# Patient Record
Sex: MALE
[De-identification: names, ages, dates, MRNs, and addresses within clinical notes are randomized per-mention and may not be internally consistent; named-entity substitution may affect disease eponyms.]

---

## 2018-07-09 PROBLEM — Z00.00 ENCOUNTER FOR PREVENTIVE HEALTH EXAMINATION: Status: ACTIVE | Noted: 2018-07-09

## 2018-07-16 ENCOUNTER — APPOINTMENT (OUTPATIENT)
Dept: CARDIOLOGY | Facility: CLINIC | Age: 53
End: 2018-07-16
Payer: COMMERCIAL

## 2018-07-16 ENCOUNTER — NON-APPOINTMENT (OUTPATIENT)
Age: 53
End: 2018-07-16

## 2018-07-16 VITALS — HEART RATE: 77 BPM | HEIGHT: 69 IN | WEIGHT: 248 LBS | OXYGEN SATURATION: 98 % | BODY MASS INDEX: 36.73 KG/M2

## 2018-07-16 VITALS — SYSTOLIC BLOOD PRESSURE: 124 MMHG | DIASTOLIC BLOOD PRESSURE: 80 MMHG

## 2018-07-16 DIAGNOSIS — Z82.49 FAMILY HISTORY OF ISCHEMIC HEART DISEASE AND OTHER DISEASES OF THE CIRCULATORY SYSTEM: ICD-10-CM

## 2018-07-16 DIAGNOSIS — Z86.59 PERSONAL HISTORY OF OTHER MENTAL AND BEHAVIORAL DISORDERS: ICD-10-CM

## 2018-07-16 DIAGNOSIS — R29.818 OTHER SYMPTOMS AND SIGNS INVOLVING THE NERVOUS SYSTEM: ICD-10-CM

## 2018-07-16 DIAGNOSIS — Z82.3 FAMILY HISTORY OF STROKE: ICD-10-CM

## 2018-07-16 DIAGNOSIS — Z87.898 PERSONAL HISTORY OF OTHER SPECIFIED CONDITIONS: ICD-10-CM

## 2018-07-16 DIAGNOSIS — R53.83 OTHER FATIGUE: ICD-10-CM

## 2018-07-16 DIAGNOSIS — F19.21 OTHER PSYCHOACTIVE SUBSTANCE DEPENDENCE, IN REMISSION: ICD-10-CM

## 2018-07-16 DIAGNOSIS — E78.00 PURE HYPERCHOLESTEROLEMIA, UNSPECIFIED: ICD-10-CM

## 2018-07-16 DIAGNOSIS — Z87.891 PERSONAL HISTORY OF NICOTINE DEPENDENCE: ICD-10-CM

## 2018-07-16 DIAGNOSIS — R60.0 LOCALIZED EDEMA: ICD-10-CM

## 2018-07-16 DIAGNOSIS — R06.02 SHORTNESS OF BREATH: ICD-10-CM

## 2018-07-16 PROCEDURE — 93000 ELECTROCARDIOGRAM COMPLETE: CPT

## 2018-07-16 PROCEDURE — 99204 OFFICE O/P NEW MOD 45 MIN: CPT

## 2018-07-16 RX ORDER — FUROSEMIDE 20 MG/1
20 TABLET ORAL DAILY
Qty: 90 | Refills: 1 | Status: ACTIVE | COMMUNITY
Start: 2018-07-16

## 2018-07-31 ENCOUNTER — APPOINTMENT (OUTPATIENT)
Dept: CARDIOLOGY | Facility: CLINIC | Age: 53
End: 2018-07-31

## 2018-08-17 ENCOUNTER — APPOINTMENT (OUTPATIENT)
Dept: CARDIOLOGY | Facility: CLINIC | Age: 53
End: 2018-08-17

## 2018-10-15 ENCOUNTER — EMERGENCY (EMERGENCY)
Facility: HOSPITAL | Age: 53
LOS: 1 days | Discharge: HOME | End: 2018-10-15
Attending: EMERGENCY MEDICINE
Payer: COMMERCIAL

## 2018-10-15 VITALS — WEIGHT: 225.09 LBS | HEIGHT: 69 IN

## 2018-10-15 VITALS
TEMPERATURE: 98 F | OXYGEN SATURATION: 100 % | SYSTOLIC BLOOD PRESSURE: 126 MMHG | RESPIRATION RATE: 17 BRPM | HEART RATE: 76 BPM | DIASTOLIC BLOOD PRESSURE: 74 MMHG

## 2018-10-15 LAB
ALBUMIN SERPL ELPH-MCNC: 4.2 G/DL — SIGNIFICANT CHANGE UP (ref 3.3–5)
ALP SERPL-CCNC: 71 U/L — SIGNIFICANT CHANGE UP (ref 40–120)
ALT FLD-CCNC: 23 U/L — SIGNIFICANT CHANGE UP (ref 10–45)
ANION GAP SERPL CALC-SCNC: 14 MMOL/L — SIGNIFICANT CHANGE UP (ref 5–17)
APTT BLD: 32.9 SEC — SIGNIFICANT CHANGE UP (ref 27.5–37.4)
AST SERPL-CCNC: 21 U/L — SIGNIFICANT CHANGE UP (ref 10–40)
BASOPHILS # BLD AUTO: 0.1 K/UL — SIGNIFICANT CHANGE UP (ref 0–0.2)
BASOPHILS NFR BLD AUTO: 1 % — SIGNIFICANT CHANGE UP (ref 0–2)
BILIRUB SERPL-MCNC: 0.4 MG/DL — SIGNIFICANT CHANGE UP (ref 0.2–1.2)
BUN SERPL-MCNC: 11 MG/DL — SIGNIFICANT CHANGE UP (ref 7–23)
CALCIUM SERPL-MCNC: 9 MG/DL — SIGNIFICANT CHANGE UP (ref 8.4–10.5)
CHLORIDE SERPL-SCNC: 103 MMOL/L — SIGNIFICANT CHANGE UP (ref 96–108)
CO2 SERPL-SCNC: 20 MMOL/L — LOW (ref 22–31)
CREAT SERPL-MCNC: 0.77 MG/DL — SIGNIFICANT CHANGE UP (ref 0.5–1.3)
EOSINOPHIL # BLD AUTO: 0.3 K/UL — SIGNIFICANT CHANGE UP (ref 0–0.5)
EOSINOPHIL NFR BLD AUTO: 3.5 % — SIGNIFICANT CHANGE UP (ref 0–6)
GLUCOSE SERPL-MCNC: 118 MG/DL — HIGH (ref 70–99)
HCT VFR BLD CALC: 48.2 % — SIGNIFICANT CHANGE UP (ref 39–50)
HGB BLD-MCNC: 15.8 G/DL — SIGNIFICANT CHANGE UP (ref 13–17)
INR BLD: 1.14 RATIO — SIGNIFICANT CHANGE UP (ref 0.88–1.16)
LIDOCAIN IGE QN: 18 U/L — SIGNIFICANT CHANGE UP (ref 7–60)
LYMPHOCYTES # BLD AUTO: 3.1 K/UL — SIGNIFICANT CHANGE UP (ref 1–3.3)
LYMPHOCYTES # BLD AUTO: 33.9 % — SIGNIFICANT CHANGE UP (ref 13–44)
MCHC RBC-ENTMCNC: 27.6 PG — SIGNIFICANT CHANGE UP (ref 27–34)
MCHC RBC-ENTMCNC: 32.8 GM/DL — SIGNIFICANT CHANGE UP (ref 32–36)
MCV RBC AUTO: 84.1 FL — SIGNIFICANT CHANGE UP (ref 80–100)
MONOCYTES # BLD AUTO: 0.4 K/UL — SIGNIFICANT CHANGE UP (ref 0–0.9)
MONOCYTES NFR BLD AUTO: 4.9 % — SIGNIFICANT CHANGE UP (ref 2–14)
NEUTROPHILS # BLD AUTO: 5.2 K/UL — SIGNIFICANT CHANGE UP (ref 1.8–7.4)
NEUTROPHILS NFR BLD AUTO: 56.8 % — SIGNIFICANT CHANGE UP (ref 43–77)
PLATELET # BLD AUTO: 298 K/UL — SIGNIFICANT CHANGE UP (ref 150–400)
POTASSIUM SERPL-MCNC: 4.3 MMOL/L — SIGNIFICANT CHANGE UP (ref 3.5–5.3)
POTASSIUM SERPL-SCNC: 4.3 MMOL/L — SIGNIFICANT CHANGE UP (ref 3.5–5.3)
PROT SERPL-MCNC: 8 G/DL — SIGNIFICANT CHANGE UP (ref 6–8.3)
PROTHROM AB SERPL-ACNC: 12.3 SEC — SIGNIFICANT CHANGE UP (ref 9.8–12.7)
RBC # BLD: 5.73 M/UL — SIGNIFICANT CHANGE UP (ref 4.2–5.8)
RBC # FLD: 11.9 % — SIGNIFICANT CHANGE UP (ref 10.3–14.5)
SODIUM SERPL-SCNC: 137 MMOL/L — SIGNIFICANT CHANGE UP (ref 135–145)
WBC # BLD: 9.1 K/UL — SIGNIFICANT CHANGE UP (ref 3.8–10.5)
WBC # FLD AUTO: 9.1 K/UL — SIGNIFICANT CHANGE UP (ref 3.8–10.5)

## 2018-10-15 PROCEDURE — 70450 CT HEAD/BRAIN W/O DYE: CPT | Mod: 26

## 2018-10-15 PROCEDURE — 85027 COMPLETE CBC AUTOMATED: CPT

## 2018-10-15 PROCEDURE — 85610 PROTHROMBIN TIME: CPT

## 2018-10-15 PROCEDURE — 83690 ASSAY OF LIPASE: CPT

## 2018-10-15 PROCEDURE — 96374 THER/PROPH/DIAG INJ IV PUSH: CPT | Mod: XU

## 2018-10-15 PROCEDURE — 12001 RPR S/N/AX/GEN/TRNK 2.5CM/<: CPT

## 2018-10-15 PROCEDURE — 99285 EMERGENCY DEPT VISIT HI MDM: CPT | Mod: 25

## 2018-10-15 PROCEDURE — 90471 IMMUNIZATION ADMIN: CPT

## 2018-10-15 PROCEDURE — 99284 EMERGENCY DEPT VISIT MOD MDM: CPT | Mod: 25

## 2018-10-15 PROCEDURE — 71045 X-RAY EXAM CHEST 1 VIEW: CPT

## 2018-10-15 PROCEDURE — 80053 COMPREHEN METABOLIC PANEL: CPT

## 2018-10-15 PROCEDURE — 71045 X-RAY EXAM CHEST 1 VIEW: CPT | Mod: 26

## 2018-10-15 PROCEDURE — 70450 CT HEAD/BRAIN W/O DYE: CPT

## 2018-10-15 PROCEDURE — 85730 THROMBOPLASTIN TIME PARTIAL: CPT

## 2018-10-15 RX ORDER — TETANUS TOXOID, REDUCED DIPHTHERIA TOXOID AND ACELLULAR PERTUSSIS VACCINE, ADSORBED 5; 2.5; 8; 8; 2.5 [IU]/.5ML; [IU]/.5ML; UG/.5ML; UG/.5ML; UG/.5ML
0.5 SUSPENSION INTRAMUSCULAR ONCE
Refills: 0 | Status: COMPLETED | OUTPATIENT
Start: 2018-10-15 | End: 2018-10-15

## 2018-10-15 RX ORDER — KETOROLAC TROMETHAMINE 30 MG/ML
15 SYRINGE (ML) INJECTION ONCE
Refills: 0 | Status: DISCONTINUED | OUTPATIENT
Start: 2018-10-15 | End: 2018-10-15

## 2018-10-15 RX ADMIN — Medication 15 MILLIGRAM(S): at 12:06
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
## 2018-10-15 NOTE — ED PROVIDER NOTE - PHYSICAL EXAMINATION
Rupa Maharaj M.D.:   patient awake alert NAD .   LUNGS CTAB no wheeze no crackle.   CARD RRR no m/r/g.    Abdomen soft NT ND no rebound no guarding no CVA tenderness.   EXT WWP no edema no calf tenderness CV 2+DP/PT bilaterally. no midline c./t.l spine tenderness. chest wall and pelvis stable without pain or bony deformity.  neuro A&Ox3 gait normal.    skin warm and dry no rash 1 cm linear laceration to posterior right occiput.  HEENT: moist mucous membranes, PERRL, EOMI

## 2018-10-15 NOTE — ED ADULT NURSE NOTE - NSIMPLEMENTINTERV_GEN_ALL_ED
Implemented All Fall Risk Interventions:  [place] to call system. Call bell, personal items and telephone within reach. Instruct patient to call for assistance. Room bathroom lighting operational. Non-slip footwear when patient is off stretcher. Physically safe environment: no spills, clutter or unnecessary equipment. Stretcher in lowest position, wheels locked, appropriate side rails in place. Provide visual cue, wrist band, yellow gown, etc. Monitor gait and stability. Monitor for mental status changes and reorient to person, place, and time. Review medications for side effects contributing to fall risk. Reinforce activity limits and safety measures with patient and family.

## 2018-10-15 NOTE — ED PROCEDURE NOTE - ATTENDING CONTRIBUTION TO CARE
Attending MD Dipti Stark: Risks, benefit and alternatives of procedure explained to patient and patient demonstrated verbal understanding and consent.  I was present during the key portions of the procedure. Patient tolerated procedure well without complications

## 2018-10-15 NOTE — ED PROVIDER NOTE - MEDICAL DECISION MAKING DETAILS
level 2 trauma. primary intact, secondary notable for head laceration. for labs, head ct, tetanus, lac repair. anticipate dc home. c-collar cleared clinically. level 2 trauma. primary intact, secondary notable for head laceration. for labs, head ct, tetanus, lac repair. anticipate dc home. c-collar cleared clinically.    Dipti Stark MD - Attending Physician: Pt here as level 2 trauma, fall CHI, ?LOC. Scalp lac, otherwise normal exam. CT, lac repair

## 2018-10-15 NOTE — ED PROVIDER NOTE - OBJECTIVE STATEMENT
CASEY EsquedaD: 53M hx HTN BIBEMS as a prenotification level 2 trauma for fall with LOC. initially reported fall from height of 3feet onto concrete from truck bed hitting head with LOC. by arrival pt notes tripped and fell from standing. currently complaining of headache. no other complaints.  unknown last tetanus.

## 2018-10-15 NOTE — ED PROVIDER NOTE - ATTENDING CONTRIBUTION TO CARE
Dipti Stark MD - Attending Physician: I have personally seen and examined this patient with the resident/fellow.  I have fully participated in the care of this patient. I have reviewed all pertinent clinical information, including history, physical exam, plan and the Resident/Fellow’s note and agree except as noted. See MDM

## 2018-10-15 NOTE — CONSULT NOTE ADULT - SUBJECTIVE AND OBJECTIVE BOX
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 Pt fell backward and hit his head. + LOC. When EMS arrived GCS was 14 and improved to 15 on arrival in ED. No nausea or vomiting. + retrograde amnesia to event. Complains of headache with no dizziness or blurry vision.    Primary Survey:  ***  A - airway intact  B - bilateral breath sounds and good chest rise  C - initial BP  BP: 138/88 (10-15-18 @ 10:04) *** , HR HR: 82 (10-15-18 @ 10:04) *** , palpable pulses in all extremities  D - GCS 15 on arrival  Exposure obtained      Secondary Survey: ***  General: NAD, awake, alert  HEENT: Normocephalic, EOMI, PEERLA, 1cm laceration to posterior scalp with no active bleeding  Neck: Soft, midline trachea. no cspine tenderness and full range of motion intact  Chest: No chest wall tenderness.   Cardiac: S1, S2, RRR  Respiratory: Bilateral breath sounds, clear and equal bilaterally  Abdomen: Soft, non-distended, non-tender, no rebound, no guarding, no masses palpated, well healed RUQ incision  Groin: Normal appearing  Ext: palp radial b/l UE, b/l DP palp in Lower Extrem, motor and sensory grossly intact in all 4 extremities, R 2nd digit well healed amputation site  Back: no TTP, no palpable runoff/stepoff/deformity  Rectal: No antelmo blood, OSVALDO with good tone    ROS: 10-system review is otherwise negative except HPI above.      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    FAMILY HISTORY:    [x] Family history not pertinent as reviewed with the patient and family    SOCIAL HISTORY:  previous IV drug abuse    ALLERGIES: No Known Allergies      HOME MEDICATIONS: none    --------------------------------------------------------------------------------------------    Vitals:   T(C): 36.8 (10-15-18 @ 10:04), Max: 36.8 (10-15-18 @ 10:04)  HR: 82 (10-15-18 @ 10:04) (82 - 82)  BP: 138/88 (10-15-18 @ 10:04) (138/88 - 138/88)  RR: 16 (10-15-18 @ 10:04) (16 - 16)  SpO2: 98% (10-15-18 @ 10:04) (98% - 98%)  CAPILLARY BLOOD GLUCOSE        CAPILLARY BLOOD GLUCOSE          Height (cm): 175.26 (10-15 @ 09:52)  Weight (kg): 102.1 (10-15 @ 09:52)  BMI (kg/m2): 33.2 (10-15 @ 09:52)  BSA (m2): 2.17 (10-15 @ 09:52)    PHYSICAL EXAM: ***  see above    --------------------------------------------------------------------------------------------    LABS                --------------------------------------------------------------------------------------------    MICROBIOLOGY      --------------------------------------------------------------------------------------------    IMAGING  ***    --------------------------------------------------------------------------------------------

## 2018-10-25 ENCOUNTER — EMERGENCY (EMERGENCY)
Facility: HOSPITAL | Age: 53
LOS: 1 days | Discharge: HOME | End: 2018-10-25
Attending: EMERGENCY MEDICINE
Payer: SELF-PAY

## 2018-10-25 VITALS
OXYGEN SATURATION: 94 % | RESPIRATION RATE: 17 BRPM | DIASTOLIC BLOOD PRESSURE: 80 MMHG | SYSTOLIC BLOOD PRESSURE: 120 MMHG | HEART RATE: 62 BPM | WEIGHT: 244.93 LBS | HEIGHT: 69 IN | TEMPERATURE: 98 F

## 2018-10-25 PROCEDURE — G0463: CPT

## 2018-10-25 NOTE — ED PROVIDER NOTE - OBJECTIVE STATEMENT
54yo M with PMH HLD, recent fall from standing height + head injury with LOC on 10/15/18, seen in [facility] ED with 3 staples placed for scalp lac, presenting for staple removal. Pt reports HA at time of injury, no recurrent HAs. Tdap given at initial visit. Denies any fever/chills, vision changes, dizziness, HA, neck pain, n/v, numbness/tingling, weakness, drainage from wound.

## 2018-10-25 NOTE — ED PROVIDER NOTE - HEAD SHAPE
+ 2cm healed laceration with 3 staples in place, no erythema, no drainage, no ttp + 2cm healed scalp laceration to occipital region with 3 staples in place, no erythema, no drainage, no ttp + 2cm healed scalp laceration to occipital region with 3 staples in place, + mild directly overlying erythema, no drainage, no ttp

## 2018-10-25 NOTE — ED PROVIDER NOTE - ATTENDING CONTRIBUTION TO CARE
Attending MD Taylor:   I personally have seen and examined this patient.  Physician assistant note reviewed and agree on plan of care and except where noted.  See below for details.     53M with PMH including HLD presents to the ED for staple removal.  Review of EMR reveals patient fell from standing on 10/15/18 and had a 2cm occipital head wound which was repaired by three staples.  Reports that day had a headache, but none since. Denies change in vision, double vision, sudden loss of vision. Denies numbness/weakness/tingling in extremities. Denies fevers, chills, dizziness, weakness. Denies chest pain, shortness of breath, palpitations. Denies abdominal pain, nausea, vomiting, diarrhea, blood in stools. Denies loss of urinary or bowel continence. On exam, NAD, head NCAT except for three staples in place at occiput, edges well approximated no tenderness to palpation, no purulence expressed, no bleeding from site, minimal nonblanching erythema to most cephalad aspect of wound, ?healing abrasion; A/P: 53M with stapled head wound, here for staple removal, see procedure note, tolerated well. Follow up instructions given, importance of follow up emphasized, return to ED parameters reviewed and patient verbalized understanding.  All questions answered, all concerns addressed. Attending MD Taylor:   I personally have seen and examined this patient.  Physician assistant note reviewed and agree on plan of care and except where noted.  See below for details.     Seen in [facility], unaccompanied    53M with PMH including HLD presents to the ED for staple removal.  Review of EMR reveals patient fell from standing on 10/15/18 and had a 2cm occipital head wound which was repaired by three staples.  Reports that day had a headache, but none since. Denies change in vision, double vision, sudden loss of vision. Denies numbness/weakness/tingling in extremities. Denies fevers, chills, dizziness, weakness. Denies chest pain, shortness of breath, palpitations. Denies abdominal pain, nausea, vomiting, diarrhea, blood in stools. Denies loss of urinary or bowel continence. On exam, NAD, head NCAT except for three staples in place at occiput, edges well approximated no tenderness to palpation, no purulence expressed, no bleeding from site, minimal nonblanching erythema to most cephalad aspect of wound, ?healing abrasion; A/P: 53M with stapled head wound, here for staple removal, see procedure note, tolerated well. Follow up instructions given, importance of follow up emphasized, return to ED parameters reviewed and patient verbalized understanding.  All questions answered, all concerns addressed.

## 2018-10-25 NOTE — ED PROVIDER NOTE - PLAN OF CARE
Follow up with your Primary care Provider upon discharge.    Return to ER for any increased pain, redness, streaking (red lines), swelling, fever/chills, vision changes, numbness/tingling, weakness, or any other concerning symptoms. Follow up with your Primary care Provider upon discharge.    Return to ER for any increased pain, redness, swelling, drainage from wound, fever/chills, vision changes, numbness/tingling, weakness, or any other concerning symptoms.

## 2018-10-25 NOTE — ED PROVIDER NOTE - CARE PLAN
Principal Discharge DX:	Stapled skin wound  Assessment and plan of treatment:	Follow up with your Primary care Provider upon discharge.    Return to ER for any increased pain, redness, streaking (red lines), swelling, fever/chills, vision changes, numbness/tingling, weakness, or any other concerning symptoms. Principal Discharge DX:	Stapled skin wound  Assessment and plan of treatment:	Follow up with your Primary care Provider upon discharge.    Return to ER for any increased pain, redness, swelling, drainage from wound, fever/chills, vision changes, numbness/tingling, weakness, or any other concerning symptoms.

## 2021-03-18 ENCOUNTER — EMERGENCY (EMERGENCY)
Facility: HOSPITAL | Age: 56
LOS: 1 days | Discharge: HOME | End: 2021-03-18
Attending: PERSONAL EMERGENCY RESPONSE ATTENDANT
Payer: COMMERCIAL

## 2021-03-18 VITALS
WEIGHT: 229.94 LBS | TEMPERATURE: 98 F | HEIGHT: 69 IN | RESPIRATION RATE: 18 BRPM | OXYGEN SATURATION: 96 % | SYSTOLIC BLOOD PRESSURE: 144 MMHG | HEART RATE: 71 BPM | DIASTOLIC BLOOD PRESSURE: 81 MMHG

## 2021-03-18 VITALS
OXYGEN SATURATION: 97 % | DIASTOLIC BLOOD PRESSURE: 76 MMHG | RESPIRATION RATE: 18 BRPM | SYSTOLIC BLOOD PRESSURE: 118 MMHG | TEMPERATURE: 98 F | HEART RATE: 61 BPM

## 2021-03-18 PROCEDURE — 73502 X-RAY EXAM HIP UNI 2-3 VIEWS: CPT

## 2021-03-18 PROCEDURE — 99284 EMERGENCY DEPT VISIT MOD MDM: CPT | Mod: 25

## 2021-03-18 PROCEDURE — 73502 X-RAY EXAM HIP UNI 2-3 VIEWS: CPT | Mod: 26,LT

## 2021-03-18 PROCEDURE — 73552 X-RAY EXAM OF FEMUR 2/>: CPT

## 2021-03-18 PROCEDURE — 73030 X-RAY EXAM OF SHOULDER: CPT

## 2021-03-18 PROCEDURE — 73552 X-RAY EXAM OF FEMUR 2/>: CPT | Mod: 26,LT

## 2021-03-18 PROCEDURE — 72170 X-RAY EXAM OF PELVIS: CPT

## 2021-03-18 PROCEDURE — 99284 EMERGENCY DEPT VISIT MOD MDM: CPT

## 2021-03-18 PROCEDURE — 73030 X-RAY EXAM OF SHOULDER: CPT | Mod: 26,RT

## 2021-03-18 RX ORDER — ACETAMINOPHEN 500 MG
975 TABLET ORAL ONCE
Refills: 0 | Status: COMPLETED | OUTPATIENT
Start: 2021-03-18 | End: 2021-03-18

## 2021-03-18 NOTE — ED PROVIDER NOTE - PHYSICAL EXAMINATION
NO midline spinal TTP/stepoffs     R AC joint point TTP, no TTP over bilateral clavicles, no chest wall TTP, abdomen soft, non-tender, pelvic rock stable, Nvsc intact in all distal extremities, L hip with large >20 cm hematoma and soft non-tender fluctuance adjacent and inferior to L trochanter, FROM of L hip, knee, able to raise off bed at hip and flex knee without assistance    NO TTP over R arm/L arm, R leg, L arm with subacute eschar over lateral forearm without evidence of superinfection

## 2021-03-18 NOTE — ED PROVIDER NOTE - NSFOLLOWUPINSTRUCTIONS_ED_ALL_ED_FT
Follow up with your Primary Care Physician within the next 2-3 days  Bring a copy of your test results with you to your appointment  Continue your current medication regimen  Return to the Emergency Room if you experience new or worsening symptoms abdominal pain, nausea, vomiting, fever chills, cough, chest pain, shortness of breath, dizziness, slurred speech, weakness, gait abnormality   Take Tylenol 1000mg every 6 hours and alternate with naprosyn every 8 hours  Follow up with orthopedics  [address] [place] [address]  Phone: [phone]

## 2021-03-18 NOTE — ED PROVIDER NOTE - CARE PLAN
Principal Discharge DX:	Hip pain  Secondary Diagnosis:	Trochanteric bursitis of left hip  Secondary Diagnosis:	Right shoulder pain, unspecified chronicity

## 2021-03-18 NOTE — ED ADULT NURSE NOTE - NSIMPLEMENTINTERV_GEN_ALL_ED
Implemented All Fall Risk Interventions:  [place] to call system. Call bell, personal items and telephone within reach. Instruct patient to call for assistance. Room bathroom lighting operational. Non-slip footwear when patient is off stretcher. Physically safe environment: no spills, clutter or unnecessary equipment. Stretcher in lowest position, wheels locked, appropriate side rails in place. Provide visual cue, wrist band, yellow gown, etc. Monitor gait and stability. Monitor for mental status changes and reorient to person, place, and time. Review medications for side effects contributing to fall risk. Reinforce activity limits and safety measures with patient and family.
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
## 2021-03-18 NOTE — ED PROVIDER NOTE - NSFOLLOWUPCLINICS_GEN_ALL_ED_FT
[facility]  Orthopedic Surgery  [address] [address]  Phone: [phone]  Fax:   Follow Up Time: 4-6 Days

## 2021-03-18 NOTE — ED ADULT NURSE REASSESSMENT NOTE - NS ED NURSE REASSESS COMMENT FT1
No fractures noted on XR. Pt discharged home to follow up with PMD and Orthopedics. VSS. Pt ambulate to discharge area with steady gait.
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
## 2021-03-18 NOTE — ED PROVIDER NOTE - PATIENT PORTAL LINK FT
You can access the FollowMyHealth Patient Portal offered by [facility] by registering at the following website: [email or web address]. By joining [employer]’s FollowMyHealth portal, you will also be able to view your health information using other applications (apps) compatible with our system.

## 2021-03-18 NOTE — ED PROVIDER NOTE - ATTENDING CONTRIBUTION TO CARE
Attending MD Ramos.  PT well appearing, note authored by me.  Planned XR's to eval for avulsion fx and R AC separation.  Hx and exam c/w L traumatic trochanteric bursitis and bruising. Attending MD Ramos.  PT well appearing, note authored by me.  Planned XR's to eval for avulsion fx and R AC separation.  Hx and exam c/w L traumatic trochanteric bursitis and bruising.  Pt remains ambulatory with antalgic gait.  XR's non-actionable.  Counseled re: rest, ice, elevation, NSAID's and ortho f/u.  Remains nvsc intact distal to site.  Stable for discharge home.

## 2021-06-03 NOTE — ED ADULT NURSE NOTE - CAS EDN DISCHARGE INTERVENTIONS
Order for Durable Medical Equipment was processed and equipment ordered.     DME provider: [facility]    Date Faxed: 12/3/2019    Ordering Provider: Darshan Christianson DO    PAP Order Type: New Device Order    Fax Number: IN HOUSE DME: [email or web address]           no IV

## 2022-08-15 NOTE — ED ADULT NURSE NOTE - DISCHARGE TEACHING
There are two small remaining nodules at the hair line      Please schedule an ultrasound, and return to discuss the findings    Please don't rub or squeeze the lumps
by MD

## 2023-02-28 NOTE — ED ADULT TRIAGE NOTE - DOMESTIC TRAVEL HIGH RISK QUESTION
Adults, and children 5 years and older, can use throat lozenges or numbing throat sprays to help reduce pain. Gargling with warm salt water will also help reduce throat pain. Dissolve 1/2 teaspoon of salt in 1 glass of warm water. Children can sip on juice or an ice pop. Children 5 years and older can also suck on a lollipop or hard candy. (Hard candy and lozenges can be a choking hazard in children younger than 5 years.)    Cepacol throat lozenges  Chloraseptic spray  Ibuprofen and/or tylenol for fever and pain.     Humidifier use  Sucking on ice chips   Sleep in recliner       STREP THROAT- Replace tooth brush/chapstick 24 hours after starting antibiotic. Wash scarfs, masks, or anything that could have come in contact with oral secretions.     -Emergency warning signs (seek care at emergency department) include but are not limited to worsening pain, fever higher than 100.4, swelling of the throat, difficulty swallowing, difficulty breathing, pain in chest, confusion, blue lips.    --------------------------------------------------------------------------------------------------------------    [facility]    Monday - Friday 8:00 a.m. - 8:00 p.m.  Saturday  8:00 a.m. - 4:00 p.m.  Sunday  CLOSED     -*-*-*-*-*-*-*-  The [facility] is now OPEN Monday to Saturday  [address] [place] [id number]  -*-*-*-*-*-*-*-    [email or web address]  See current wait times for [facility] in real-time  Reserve your waiting-room spot in line     [email or web address]  [place] for the patient portal  See test results and make virtual visits with your primary care provider        ----------------  Thank you for choosing [facility] today.  We hope you had a pleasant experience and we look forward to serving your future needs.    If you receive a survey in the mail about today's services, we hope that you will take a few minutes to let us know about your  experience.    If you have any questions about your VISIT, please call [phone]    If you have any questions about your BILL, please call [phone]    If you need a copy of your MEDICAL RECORD, please call [phone] or email [email or web address] or use the [employer] aarti->My Record->Document Center->Download Medical Records->Send Medical Record Request.    ------------------------------------------------------------------------------------------------------------  UNLESS OTHERWISE INSTRUCTED BY YOUR URGENT CARE PROVIDER TODAY, all follow-up for your medical issues should be managed by your primary care provider. The Urgent Care does not manage chronic medical issues or refill medications. You are responsible for scheduling and keeping any necessary follow-up visits with your primary care provider after this visit today.   ------------------------------------------------------------------------------------------------------------           No